# Patient Record
Sex: MALE | NOT HISPANIC OR LATINO | URBAN - METROPOLITAN AREA
[De-identification: names, ages, dates, MRNs, and addresses within clinical notes are randomized per-mention and may not be internally consistent; named-entity substitution may affect disease eponyms.]

---

## 2019-08-07 ENCOUNTER — IMPORTED ENCOUNTER (OUTPATIENT)
Dept: URBAN - METROPOLITAN AREA CLINIC 1 | Facility: CLINIC | Age: 24
End: 2019-08-07

## 2019-08-07 PROBLEM — H52.13: Noted: 2019-08-07

## 2019-08-07 PROCEDURE — S0621 ROUTINE OPHTHALMOLOGICAL EXA: HCPCS

## 2019-08-07 NOTE — PATIENT DISCUSSION
1. Myopia -- Rx was given for correction if indicated and requested. 2. Dry Eye -- Recommend to use ATs BID OU. Return for an appointment in 1 year for 40. with Dr. Narciso Rincon.

## 2022-04-02 ASSESSMENT — TONOMETRY
OS_IOP_MMHG: 16
OD_IOP_MMHG: 16

## 2022-04-02 ASSESSMENT — VISUAL ACUITY
OD_CC: 20/20
OS_SC: J1
OS_CC: 20/20
OD_SC: J1

## 2022-07-08 ENCOUNTER — COMPREHENSIVE EXAM (OUTPATIENT)
Dept: URBAN - METROPOLITAN AREA CLINIC 1 | Facility: CLINIC | Age: 27
End: 2022-07-08

## 2022-07-08 DIAGNOSIS — H52.13: ICD-10-CM

## 2022-07-08 DIAGNOSIS — Z01.00: ICD-10-CM

## 2022-07-08 PROCEDURE — 92014 COMPRE OPH EXAM EST PT 1/>: CPT

## 2022-07-08 ASSESSMENT — KERATOMETRY
OD_AXISANGLE2_DEGREES: 78
OS_AXISANGLE_DEGREES: 175
OD_K2POWER_DIOPTERS: 45.25
OS_K2POWER_DIOPTERS: 45.25
OS_AXISANGLE2_DEGREES: 85
OD_K1POWER_DIOPTERS: 45.00
OD_AXISANGLE_DEGREES: 168
OS_K1POWER_DIOPTERS: 45.00

## 2022-07-08 ASSESSMENT — TONOMETRY
OD_IOP_MMHG: 15
OS_IOP_MMHG: 15

## 2022-07-08 ASSESSMENT — VISUAL ACUITY
OS_SC: 20/25
OD_SC: 20/25
OS_SC: J1+
OD_SC: J1+

## 2022-07-08 NOTE — PATIENT DISCUSSION
(. 65/. 60) IOP Stable @ 15 OU. (+) Family Hx(Father). Patient is considered low risk. Condition was discussed with patient and patient understands. Will continue to monitor patient for any progression in condition. Patient was advised to call us with any problems, questions, or concerns. Will Re-evaluate Next year and consider testing.

## 2022-09-26 ENCOUNTER — FOLLOW UP (OUTPATIENT)
Dept: URBAN - METROPOLITAN AREA CLINIC 1 | Facility: CLINIC | Age: 27
End: 2022-09-26

## 2022-09-26 DIAGNOSIS — H40.013: ICD-10-CM

## 2022-09-26 PROCEDURE — 92083 EXTENDED VISUAL FIELD XM: CPT

## 2022-09-26 PROCEDURE — 92012 INTRM OPH EXAM EST PATIENT: CPT

## 2022-09-26 PROCEDURE — 92133 CPTRZD OPH DX IMG PST SGM ON: CPT

## 2022-09-26 ASSESSMENT — TONOMETRY
OS_IOP_MMHG: 16
OD_IOP_MMHG: 16

## 2022-09-26 ASSESSMENT — VISUAL ACUITY
OD_SC: J1+
OS_SC: 20/20
OD_SC: 20/20
OS_SC: J1+

## 2022-09-26 NOTE — PATIENT DISCUSSION
(CD 0.65/0.60) IOP Stable @ 16 OU. Baseline OCT and VF today. OCT shows mild thinning OS and VF shows non-specific loss OU. (+) Family Hx(Father). Patient is considered low risk. Condition was discussed with patient and patient understands. Will continue to monitor patient for any progression in condition. Patient was advised to call us with any problems, questions, or concerns. Will Retest OCT in January of 2024 for next 30.